# Patient Record
Sex: MALE | Race: OTHER | HISPANIC OR LATINO | URBAN - METROPOLITAN AREA
[De-identification: names, ages, dates, MRNs, and addresses within clinical notes are randomized per-mention and may not be internally consistent; named-entity substitution may affect disease eponyms.]

---

## 2020-05-13 ENCOUNTER — EMERGENCY (EMERGENCY)
Facility: HOSPITAL | Age: 65
LOS: 1 days | Discharge: HOME | End: 2020-05-13
Attending: EMERGENCY MEDICINE | Admitting: EMERGENCY MEDICINE
Payer: MEDICARE

## 2020-05-13 VITALS
WEIGHT: 169.98 LBS | HEART RATE: 76 BPM | DIASTOLIC BLOOD PRESSURE: 80 MMHG | OXYGEN SATURATION: 97 % | RESPIRATION RATE: 18 BRPM | TEMPERATURE: 98 F | SYSTOLIC BLOOD PRESSURE: 146 MMHG

## 2020-05-13 PROCEDURE — 87491 CHLMYD TRACH DNA AMP PROBE: CPT

## 2020-05-13 PROCEDURE — 99283 EMERGENCY DEPT VISIT LOW MDM: CPT

## 2020-05-13 PROCEDURE — 87591 N.GONORRHOEAE DNA AMP PROB: CPT

## 2020-05-13 RX ORDER — ACYCLOVIR SODIUM 500 MG
800 VIAL (EA) INTRAVENOUS ONCE
Refills: 0 | Status: COMPLETED | OUTPATIENT
Start: 2020-05-13 | End: 2020-05-13

## 2020-05-13 RX ORDER — ACYCLOVIR SODIUM 500 MG
1 VIAL (EA) INTRAVENOUS
Qty: 10 | Refills: 0
Start: 2020-05-13 | End: 2020-05-17

## 2020-05-13 RX ADMIN — Medication 800 MILLIGRAM(S): at 10:13

## 2020-05-13 NOTE — ED PROVIDER NOTE - GENITOURINARY, MLM
No discharge, lesions. Uncircumcised penis with small cluster of vesicles on erythematous base on the shaft of the penis; no additional rash visualized; no penial discharge No discharge, lesions. Uncircumcised penis with small cluster of vesicles on erythematous base on the shaft of the penis; no additional rash visualized; no penile discharge

## 2020-05-13 NOTE — ED PROVIDER NOTE - CLINICAL SUMMARY MEDICAL DECISION MAKING FREE TEXT BOX
ED course unremarkable - afebrile and hemodynamically stable. Pt has cluster of vesicular lesions over shaft of penis. Low suspicion for additional STI, but CT/NG assay sent and pending. Started on acyclovir 800mg bid x5 days for episodic therapy. Pt has PMD for follow up. Strict return precautions given.

## 2020-05-13 NOTE — ED PROVIDER NOTE - NSFOLLOWUPINSTRUCTIONS_ED_ALL_ED_FT
[place] alhaji pastilla de acyclovir 800mg dos veces cada noble para 5 crain.  [place] alhaji aime con heck doctor de cabazera para chequearlo despues de terminando el medicamento.

## 2020-05-13 NOTE — ED ADULT TRIAGE NOTE - CHIEF COMPLAINT QUOTE
Patient wants a medication refill for his penile rash x few days.  Denies pain, fever, hematuria, known exposure to possible covid cases.  Mask applied pta

## 2020-05-13 NOTE — ED PROVIDER NOTE - PATIENT PORTAL LINK FT
You can access the FollowMyHealth Patient Portal offered by [facility] by registering at the following website: [email or web address]. By joining [facility] FollowMyHealth portal, you will also be able to view your health information using other applications (apps) compatible with our system.

## 2020-05-13 NOTE — ED ADULT NURSE NOTE - ISOLATION TYPE:
pt states "my left hand is swelling broke out in rash and this morning when I woke up my face was swollen" Droplet+Contact precautions

## 2020-05-13 NOTE — ED ADULT NURSE NOTE - NSIMPLEMENTINTERV_GEN_ALL_ED
Implemented All Universal Safety Interventions:  [place] to call system. Call bell, personal items and telephone within reach. Instruct patient to call for assistance. Room bathroom lighting operational. Non-slip footwear when patient is off stretcher. Physically safe environment: no spills, clutter or unnecessary equipment. Stretcher in lowest position, wheels locked, appropriate side rails in place.

## 2020-05-13 NOTE — ED PROVIDER NOTE - OBJECTIVE STATEMENT
65y M Kazakh speaking only and spoke to him in Kazakh with a history of genital herpes presents to the ED requesting an antiviral medication after rupture on penis this past week. Patient noticed a cluster of small vesicles approximately a week ago with clear discharge from penis, previously painful now resolved. Denies fever, abdominal pain, vomiting, dysuria, hematuria, penial discharge or testicular pain. Patient states he is not currently sexually active and last outbreak was over 2x years ago. Patient was recently tested for COVID which was negative and denies fever, chills, cough, or shortness of breath today. 65y Faroese-speaking male with pmhx of genital herpes presents to the ED requesting an antiviral medication after rupture on penis this past week. Patient noticed a cluster of small vesicles approximately a week ago with clear discharge from penis, previously painful now resolved. Denies fever, abdominal pain, vomiting, dysuria, hematuria, penial discharge or testicular pain. Patient states he is not currently sexually active and last outbreak was over 2x years ago. Patient was recently tested for COVID which was negative and denies fever, chills, cough, or shortness of breath today. 65y French-speaking male with pmhx of genital herpes presents to the ED requesting antiviral medication for rash on penis. Pt reports a small cluster of vesicles ~1wk ago, previously painful now resolved. Denies fever, abdominal pain, vomiting, dysuria, hematuria, penile discharge or testicular pain. Patient states he is not currently sexually active and last outbreak was >2 years ago. Pt states he recently tested negative for COVID, denies fever, chills, cough, or shortness of breath today.

## 2020-05-14 LAB
C TRACH RRNA SPEC QL NAA+PROBE: SIGNIFICANT CHANGE UP
N GONORRHOEA RRNA SPEC QL NAA+PROBE: SIGNIFICANT CHANGE UP
SPECIMEN SOURCE: SIGNIFICANT CHANGE UP

## 2020-05-17 DIAGNOSIS — Z76.0 ENCOUNTER FOR ISSUE OF REPEAT PRESCRIPTION: ICD-10-CM

## 2020-05-17 DIAGNOSIS — A60.02 HERPESVIRAL INFECTION OF OTHER MALE GENITAL ORGANS: ICD-10-CM

## 2020-05-22 NOTE — ED PROVIDER NOTE - CHPI ED SYMPTOMS NEG
no abdominal pain, no discharge, no testicular pain/no dysuria/no hematuria/no vomiting/no fever [FreeTextEntry1] : declined colonoscopy and vaccines. f/u for CPE in 06/2020. \par HTN stable continue current meds.\par  HLD: low chol. diet. continue medicines. \par  hematology note reviewed. continue hematological surveillance. labs reviewed. \par  f/u gyn.